# Patient Record
Sex: MALE
[De-identification: names, ages, dates, MRNs, and addresses within clinical notes are randomized per-mention and may not be internally consistent; named-entity substitution may affect disease eponyms.]

---

## 2018-03-30 ENCOUNTER — HOSPITAL ENCOUNTER (EMERGENCY)
Dept: HOSPITAL 25 - UCCORT | Age: 48
Discharge: HOME | End: 2018-03-30
Payer: COMMERCIAL

## 2018-03-30 VITALS — SYSTOLIC BLOOD PRESSURE: 117 MMHG | DIASTOLIC BLOOD PRESSURE: 67 MMHG

## 2018-03-30 DIAGNOSIS — S90.31XA: ICD-10-CM
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
## 2018-03-30 DIAGNOSIS — S93.601A: Primary | ICD-10-CM
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
## 2018-03-30 PROCEDURE — 90471 IMMUNIZATION ADMIN: CPT

## 2018-03-30 PROCEDURE — 99212 OFFICE O/P EST SF 10 MIN: CPT

## 2018-03-30 PROCEDURE — G0463 HOSPITAL OUTPT CLINIC VISIT: HCPCS

## 2018-03-30 PROCEDURE — 90715 TDAP VACCINE 7 YRS/> IM: CPT

## 2018-03-30 NOTE — RAD
HISTORY: Right foot injury



COMPARISONS: None



VIEWS: 3, Frontal, lateral, and oblique views of the right foot



FINDINGS:



BONE DENSITY: Normal.

BONES: There is no displaced fracture.    

JOINTS: There is no arthropathy.    

ALIGNMENT: There is no dislocation. 

SOFT TISSUES: Unremarkable.



OTHER FINDINGS: None.



IMPRESSION: 

NO ACUTE OSSEOUS INJURY. IF SYMPTOMS PERSIST, RECOMMEND REPEAT IMAGING.

## 2018-03-30 NOTE — UC
Lower Extremity/Ankle HPI





- HPI Summary


HPI Summary: 





46 yo male injured his right foot last PM


Got up to go to the bath room and injured his right foot  and fell into wall





sustained 2 small facial lacerations





no HA or LOC


no mild neck pain











- History of Current Complaint


Stated Complaint: S/P FALL-RT FOOT INJURY


Hx Obtained From: Patient


Onset/Duration: Sudden Onset, Lasting Hours


Severity Initially: Moderate


Severity Currently: Moderate


Pain Intensity: 4 - worse with wt bearing


Pain Scale Used: 0-10 Numeric


Aggravating Factor(s): Standing, Ambulation


Alleviating Factor(s): Rest, Elevation, Other - crutches


Able to Bear Weight: No





- Allergies/Home Medications


Allergies/Adverse Reactions: 


 Allergies











Allergy/AdvReac Type Severity Reaction Status Date / Time


 


No Known Allergies Allergy   Verified 03/30/18 13:06











Home Medications: 


 Home Medications





ALPRAZolam [Alprazolam Odt] 1 tab PO TID PRN 03/30/18 [History Confirmed 03/30/ 18]


Amlodipine Besylate [Norvasc] 5 mg PO DAILY 03/30/18 [History Confirmed 03/30/18

]


Chlorthalidone 25 mg PO DAILY 03/30/18 [History Confirmed 03/30/18]


DULoxetine DR CAP* [Cymbalta CAP*] 60 mg PO DAILY 03/30/18 [History Confirmed 03 /30/18]


Escitalopram Oxalate [Lexapro 10 mg] 10 mg PO DAILY 03/30/18 [History Confirmed 

03/30/18]


Lisinopril TAB* [Prinivil TAB 10 MG*] 40 mg PO DAILY 03/30/18 [History 

Confirmed 03/30/18]


Naproxen Sodium [Aleve] 2 tab PO BID PRN 03/30/18 [History Confirmed 03/30/18]











PMH/Surg Hx/FS Hx/Imm Hx


Cardiovascular History: Hypertension


Psychological History: Anxiety, Depression





- Surgical History


Surgical History: Yes


Surgery Procedure, Year, and Place: CYSTS REMOVED FROM HAND AND HEAD





- Social History


Alcohol Use: Occasionally


Substance Use Type: None


Smoking Status (MU): Never Smoked Tobacco





Review of Systems


Constitutional: Negative


Skin: Negative


Eyes: Negative


ENT: Negative


Respiratory: Negative


Cardiovascular: Negative


Gastrointestinal: Negative


Genitourinary: Negative


Motor: Negative


Neurovascular: Negative


Musculoskeletal: Arthralgia, Myalgia


Neurological: Negative


Psychological: Negative


Is Patient Immunocompromised?: No


All Other Systems Reviewed And Are Negative: Yes





Physical Exam


Triage Information Reviewed: Yes


Appearance: Well-Appearing, No Pain Distress, Well-Nourished


Vital Signs Reviewed: Yes


Eyes: Positive: Conjunctiva Clear


ENT: Positive: Hearing grossly normal, Uvula midline.  Negative: Nasal 

congestion, Nasal drainage, Sinus tenderness


Neck: Positive: Supple, Nontender, No Lymphadenopathy, Other: - FROM/no midline 

cervical tenderness


Respiratory: Positive: Lungs clear, Normal breath sounds, No respiratory 

distress, No accessory muscle use


Cardiovascular: Positive: RRR, No Murmur


Abdominal Exam: Normal


Musculoskeletal: Positive: ROM Intact, No Edema


Neurological: Positive: Alert


Psychological Exam: Normal


Skin Exam: Normal





Diagnostics





- Radiology


  ** No standard instances


Xray Interpretation: No Acute Changes


Radiology Interpretation Completed By: Radiologist





Lower Extremity Course/Dx





- Differential Dx/Diagnosis


Provider Diagnoses: right foot sprain.  heel contusion (R)





Discharge





- Sign-Out/Discharge


Documenting (check all that apply): Discharge





- Discharge Plan


Condition: Stable


Disposition: HOME


Patient Education Materials:  Foot Sprain (ED)


Referrals: 


Krishan Mcmillan MD [Medical Doctor] - 5 Days


Additional Instructions: 


crutches


CAM boot





- Billing Disposition and Condition


Condition: STABLE


Disposition: HOME





Images


Head: 


  __________________________














  __________________________





 1 - 3mm lac





 2 - 3 mm lac





Feet (Multiple View): 


  __________________________














  __________________________





 1 - tender





 2 - tender





 3 - tender

## 2019-04-22 ENCOUNTER — HOSPITAL ENCOUNTER (EMERGENCY)
Dept: HOSPITAL 25 - UCCORT | Age: 49
Discharge: HOME | End: 2019-04-22
Payer: COMMERCIAL

## 2019-04-22 VITALS — DIASTOLIC BLOOD PRESSURE: 77 MMHG | SYSTOLIC BLOOD PRESSURE: 127 MMHG
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
## 2019-04-22 DIAGNOSIS — S10.86XA: Primary | ICD-10-CM
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
## 2019-04-22 PROCEDURE — G0463 HOSPITAL OUTPT CLINIC VISIT: HCPCS

## 2019-04-22 PROCEDURE — 99212 OFFICE O/P EST SF 10 MIN: CPT

## 2019-04-22 NOTE — UC
Skin Complaint HPI





- HPI Summary


HPI Summary: 





Patient has a tick embedded in the back of his neck since yesterday morning.





- History of Current Complaint


Time Seen by Provider: 04/22/19 19:26


Stated Complaint: SKIN NECK - TICK


Hx Obtained From: Patient


Onset/Duration: Sudden Onset


Skin Exposure Onset/Duration: Days Ago - She states the tick has been embedded 

since yesterday morning.


Timing: Constant


Onset Severity: Mild


Current Severity: Mild


Location: Other - Back of neck


Character: Redness


Aggravating Factor(s): Nothing


Alleviating Factor(s): Nothing


Associated Signs & Symptoms: Positive: Negative


Related History: Insect Bite/Sting - Tick is presently embedded





- Allergy/Home Medications


Allergies/Adverse Reactions: 


 Allergies











Allergy/AdvReac Type Severity Reaction Status Date / Time


 


No Known Allergies Allergy   Verified 04/22/19 19:36














PMH/Surg Hx/FS Hx/Imm Hx


Previously Healthy: Yes





- Surgical History


Surgical History: Yes


Surgery Procedure, Year, and Place: CYSTS REMOVED FROM HAND AND HEAD





- Family History


Known Family History: Positive: Non-Contributory





- Social History


Alcohol Use: Occasionally


Substance Use Type: None


Smoking Status (MU): Never Smoked Tobacco





- Immunization History


Most Recent Tetanus Shot: Not UTD





Review of Systems


All Other Systems Reviewed And Are Negative: Yes


Skin: Positive: Other - Tick is still embedded in back of neck.


Is Patient Immunocompromised?: No





Physical Exam


Triage Information Reviewed: Yes


Appearance: Well-Appearing, No Pain Distress, Well-Nourished


Vital Signs Reviewed: Yes


Skin: Positive: Other - Tick is embedded in the right side back of neck.





Course/Dx





- Course


Course Of Treatment: 





The tick was excessively removed in one piece using the "Tick Twister".  He 

tolerated the procedure well.  He was given a prescription for doxycycline 200 

mg to take by mouth.  Instructions were also given for Lyme disease.





- Diagnoses


Provider Diagnosis: 


 Tick bite of neck








Discharge





- Sign-Out/Discharge


Documenting (check all that apply): Patient Departure


All imaging exams completed and their final reports reviewed: No Studies





- Discharge Plan


Condition: Good


Disposition: HOME


Patient Education Materials:  Tick Bite (ED)


Referrals: 


Leida Espana MD [Primary Care Provider] - 


Additional Instructions: 


Follow-up with your own primary care provider if any fever, chills, body aches 

or rashes over the next 2-4 weeks.  No dairy products, antacids or 

multivitamins 2 hours before you take the doxycycline and 2 hours after you 

take doxycycline.





- Billing Disposition and Condition


Condition: GOOD


Disposition: Home





- Attestation Statements


Provider Attestation: 





Per institutional requirements, I have reviewed the chart, however, I was not 

consulted specifically or made aware of this patient by the  midlevel provider.

  I did not personally evaluate, interact with , or disposition  this patient.